# Patient Record
Sex: MALE | Race: WHITE | NOT HISPANIC OR LATINO | Employment: OTHER | URBAN - METROPOLITAN AREA
[De-identification: names, ages, dates, MRNs, and addresses within clinical notes are randomized per-mention and may not be internally consistent; named-entity substitution may affect disease eponyms.]

---

## 2017-01-17 ENCOUNTER — TELEPHONE (OUTPATIENT)
Dept: VASCULAR SURGERY | Facility: CLINIC | Age: 67
End: 2017-01-17

## 2017-01-17 NOTE — TELEPHONE ENCOUNTER
----- Message from Pedro More sent at 1/17/2017  9:49 AM CST -----  Elvia (Dr. Albion Calvillo) called to schedule a consult for the pt for first available/pls call back at [phone]

## 2017-02-16 ENCOUNTER — OFFICE VISIT (OUTPATIENT)
Dept: VASCULAR SURGERY | Facility: CLINIC | Age: 67
End: 2017-02-16
Payer: MEDICARE

## 2017-02-16 VITALS — HEIGHT: 73 IN | WEIGHT: 259 LBS | BODY MASS INDEX: 34.33 KG/M2 | RESPIRATION RATE: 18 BRPM

## 2017-02-16 DIAGNOSIS — I72.3 ILIAC ARTERY ANEURYSM, RIGHT: Primary | ICD-10-CM

## 2017-02-16 PROCEDURE — 99999 PR PBB SHADOW E&M-EST. PATIENT-LVL II: CPT | Mod: PBBFAC,,, | Performed by: THORACIC SURGERY (CARDIOTHORACIC VASCULAR SURGERY)

## 2017-02-16 PROCEDURE — 99212 OFFICE O/P EST SF 10 MIN: CPT | Mod: PBBFAC,PO | Performed by: THORACIC SURGERY (CARDIOTHORACIC VASCULAR SURGERY)

## 2017-02-16 PROCEDURE — 99205 OFFICE O/P NEW HI 60 MIN: CPT | Mod: S$PBB,,, | Performed by: THORACIC SURGERY (CARDIOTHORACIC VASCULAR SURGERY)

## 2017-02-16 RX ORDER — ASPIRIN 81 MG/1
81 TABLET ORAL DAILY
COMMUNITY

## 2017-02-16 RX ORDER — LISINOPRIL AND HYDROCHLOROTHIAZIDE 12.5; 2 MG/1; MG/1
1 TABLET ORAL DAILY
COMMUNITY

## 2017-02-16 RX ORDER — METOPROLOL SUCCINATE 25 MG/1
25 TABLET, EXTENDED RELEASE ORAL DAILY
COMMUNITY

## 2017-02-16 RX ORDER — AMLODIPINE BESYLATE 5 MG/1
5 TABLET ORAL DAILY
COMMUNITY

## 2017-02-16 RX ORDER — SIMVASTATIN 20 MG/1
20 TABLET, FILM COATED ORAL NIGHTLY
COMMUNITY

## 2017-02-16 NOTE — LETTER
February 16, 2017      Albino Calvillo MD  [address] [facility] [address] [facility]  [facility] [address]           [facility]  [address] [facility] [address]  Phone: [phone]          Patient: Kendell Rivera   MR Number: [id number]   YOB: 1950   Date of Visit: 2/16/2017       Dear Dr. Albino Calvillo:    Thank you for referring Kendell Rivera to me for evaluation. Attached you will find relevant portions of my assessment and plan of care.    If you have questions, please do not hesitate to call me. I look forward to following Kendell Rivera along with you.    Sincerely,    Hernesto Curry MD    Enclosure  CC:  No Recipients    If you would like to receive this communication electronically, please contact [email or web address] or [phone] to request more information on [facility] access.    For providers and/or their staff who would like to refer a patient to [facility], please contact us through our one-stop-shop provider referral line, [facility], at [phone].    If you feel you have received this communication in error or would no longer like to receive these types of communications, please e-mail [email or web address]

## 2017-02-16 NOTE — PROGRESS NOTES
OFFICE VISIT NOTE    I was asked to see this patient by Dr. Calvillo.  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  The patient had a CT angiogram of the chest, abdomen and pelvis.    His twin brother had undergone repair of ascending aortic aneurysm.  He has no   aneurysms in the chest.  The abdominal aorta has no aneurysms.  However, the   right common iliac artery has the saccular aneurysm.  He denies any abdominal,   back or pelvic pain.    PAST MEDICAL HISTORY:  Coronary artery disease, degenerative disease of the   right knee, hypertension.    PAST SURGICAL HISTORY:  Percutaneous coronary intervention.    ALLERGIES:  No known drug allergies.    MEDICATIONS:  Norvasc, Ecotrin, Prinzide, Toprol-XL, Xarelto, Zocor.    FAMILY HISTORY:  Positive for ascending aortic aneurysm.    SOCIAL HISTORY:  He quit smoking cigarettes in 1982.  Denies alcohol use.    PHYSICAL EXAMINATION:  VITAL SIGNS:  Blood pressure is 121/85, respiratory rate is 18, heart rate is   91, height 6 feet 1 inch and weight 259 pounds.  GENERAL:  He is awake and alert, in no apparent distress.  HEENT:  Head is normocephalic.  Pupils are equal, round, reactive.  Sclerae are   anicteric.  NECK:  Supple.  LUNGS:  Trachea midline.  HEART:  Has a regular rate and rhythm.  LUNGS:  Clear.  ABDOMEN:  Obese, soft and nontender.  I cannot palpate any masses.  EXTREMITIES:  No ulcers.  No edema.  No hyperpigmentation.  Pulse examination 2+   brachial, 2+ radial, 2+ femoral pulses bilaterally.  He has 2+ left dorsalis   pedis pulse and 1+ right dorsalis pedis pulse.  I cannot palpate posterior   tibial pulses in either lower extremity.  NEUROLOGIC:  Awake, alert and oriented.  No lateralizing neurologic deficits.    CT angiogram of the chest, abdomen and pelvis showed a 3.3 cm eccentric saccular   aneurysm in the distal right common iliac artery.    IMPRESSION:  1.  Aneurysm of the  right common iliac artery.  2.  Coronary artery disease.  3.  Hypertension.  4.  Family history of ascending aortic aneurysm.  5.  Past history of tobacco use.  6.  Status post percutaneous coronary intervention.    RECOMMENDATIONS:  The patient has a saccular 3.3 cm right common iliac artery   aneurysm.  I think that this needs to be fixed.  The patient denies any history   of a major infection.  I doubt that this is a mycotic aneurysm.  We will try to   fix this with an endograft.  The risks and benefits were discussed with the   patient and his family.  They voiced understanding and wished to proceed.      JUSTIN  dd: 02/16/2017 09:49:28 (CST)  td: 02/16/2017 16:07:24 (CST)  Doc ID   #[id number]  Job ID #[id number]    CC: Albino Calvillo M.D.

## 2017-03-08 ENCOUNTER — TELEPHONE (OUTPATIENT)
Dept: VASCULAR SURGERY | Facility: CLINIC | Age: 67
End: 2017-03-08

## 2017-03-08 NOTE — TELEPHONE ENCOUNTER
----- Message from Tyra Macedo sent at 3/8/2017  8:27 AM CST -----  Contact: self-  [phone]  Patient called asking to speak with the nurse, patient states the office was to call the patient.  Thanks!

## 2017-03-08 NOTE — TELEPHONE ENCOUNTER
Pt informed disc is with Rep for review and we will call back once a decision has been reached for which type of surgery he is eligible for. Verbalized understanding.

## 2017-03-15 ENCOUNTER — TELEPHONE (OUTPATIENT)
Dept: VASCULAR SURGERY | Facility: CLINIC | Age: 67
End: 2017-03-15

## 2017-03-24 ENCOUNTER — TELEPHONE (OUTPATIENT)
Dept: VASCULAR SURGERY | Facility: CLINIC | Age: 67
End: 2017-03-24

## 2017-03-24 NOTE — TELEPHONE ENCOUNTER
----- Message from Divine Varela sent at 3/24/2017 10:48 AM CDT -----  Contact: self  Patient called regarding questions to ask regarding his scheduled procedure. Please contact [phone] (hftw)

## 2017-03-24 NOTE — TELEPHONE ENCOUNTER
Pt asking if he will be able to drive to [place] 10 days after his surgery on 3/31/17.  Advised that it would not be recommended that he travel such a long distance so soon after having surgery.  Verbalized understanding.

## 2017-03-28 ENCOUNTER — TELEPHONE (OUTPATIENT)
Dept: VASCULAR SURGERY | Facility: CLINIC | Age: 67
End: 2017-03-28

## 2017-03-28 NOTE — TELEPHONE ENCOUNTER
----- Message from Rosa Isela Shirley sent at 3/28/2017  1:49 PM CDT -----  Patient needs to talk to office concerning his procedure on 3/31/17. Please call back at [phone].

## 2017-03-28 NOTE — TELEPHONE ENCOUNTER
Pt asking if he will need to fast before having surgery.  Advised that he will defiantly need to fast starting the night before surgery. Reviewed pre-op appointment and reminded pt of holding xarelto x 3 days .  Verbalized understanding.

## 2017-03-30 ENCOUNTER — DOCUMENTATION ONLY (OUTPATIENT)
Dept: VASCULAR SURGERY | Facility: CLINIC | Age: 67
End: 2017-03-30

## 2017-03-30 ENCOUNTER — TELEPHONE (OUTPATIENT)
Dept: VASCULAR SURGERY | Facility: CLINIC | Age: 67
End: 2017-03-30

## 2017-03-30 NOTE — TELEPHONE ENCOUNTER
[facility] pharmacy is checking their voice mail now,  Advised that the rx was called in at 1447 today. Verbalized understanding and stated will call back if there is a problem.

## 2017-03-30 NOTE — TELEPHONE ENCOUNTER
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  Pt asked that it be called into [facility] in [place].  Verbalized understanding.      Rx called in @ [phone] to [facility].

## 2017-03-30 NOTE — TELEPHONE ENCOUNTER
----- Message from Argelia Camacho sent at 3/30/2017  3:38 PM CDT -----  Contact: Patient  Kendell, patient [phone]  [facility] [place] cell [phone], Calling to let office know they are trying to get the Rx for staff just called into [facility] in [place], but they do not have the Rx. Please advise. Thanks.

## 2017-03-30 NOTE — TELEPHONE ENCOUNTER
----- Message from Esha Ty sent at 3/30/2017 12:48 PM CDT -----  Nisha with [facility] calling to speak with nurse concerning patient's labs/please call back at [phone] (unable to reach anyone by phone or IM)

## 2017-03-30 NOTE — PROGRESS NOTES
[facility] Vancomycin 1mg/kg pre-op instead of Ancef.  Per .

## 2017-04-04 ENCOUNTER — TELEPHONE (OUTPATIENT)
Dept: VASCULAR SURGERY | Facility: CLINIC | Age: 67
End: 2017-04-04

## 2017-04-04 NOTE — TELEPHONE ENCOUNTER
----- Message from Rosa Isela Shirley sent at 4/4/2017  1:23 PM CDT -----  Patient would like to make an appointment with office for a follow up. Please call to schedule at [phone].

## 2017-04-17 ENCOUNTER — TELEPHONE (OUTPATIENT)
Dept: VASCULAR SURGERY | Facility: CLINIC | Age: 67
End: 2017-04-17

## 2017-04-17 NOTE — TELEPHONE ENCOUNTER
----- Message from Gil Gimenez sent at 4/17/2017  2:08 PM CDT -----  Contact: pt  Pt is requesting a callback(have questions about procedure he had)   Call Back#[phone]  Thanks

## 2017-04-27 ENCOUNTER — OFFICE VISIT (OUTPATIENT)
Dept: VASCULAR SURGERY | Facility: CLINIC | Age: 67
End: 2017-04-27
Payer: MEDICARE

## 2017-04-27 VITALS
HEIGHT: 73 IN | DIASTOLIC BLOOD PRESSURE: 72 MMHG | BODY MASS INDEX: 34.85 KG/M2 | RESPIRATION RATE: 16 BRPM | SYSTOLIC BLOOD PRESSURE: 113 MMHG | WEIGHT: 263 LBS | HEART RATE: 98 BPM

## 2017-04-27 DIAGNOSIS — I72.3 ILIAC ARTERY ANEURYSM, RIGHT: Primary | ICD-10-CM

## 2017-04-27 PROCEDURE — 99999 PR PBB SHADOW E&M-EST. PATIENT-LVL III: CPT | Mod: PBBFAC,,, | Performed by: THORACIC SURGERY (CARDIOTHORACIC VASCULAR SURGERY)

## 2017-04-27 PROCEDURE — 99213 OFFICE O/P EST LOW 20 MIN: CPT | Mod: PBBFAC,PO | Performed by: THORACIC SURGERY (CARDIOTHORACIC VASCULAR SURGERY)

## 2017-04-27 PROCEDURE — 99024 POSTOP FOLLOW-UP VISIT: CPT | Mod: ,,, | Performed by: THORACIC SURGERY (CARDIOTHORACIC VASCULAR SURGERY)

## 2017-04-27 NOTE — PROGRESS NOTES
OFFICE VISIT NOTE    Mr. Rivera underwent endograft repair of right common iliac artery aneurysm with   preservation of the hypogastric artery.  He is doing very well.  His incisions   have healed.  He has no pain.  Feet are pink and warm with good capillary   refill.  We will do a CT angiogram of the abdomen and pelvis in six months.      JUSTIN  dd: 04/27/2017 10:38:40 (CDT)  td: 04/27/2017 11:35:37 (CDT)  Doc ID   #[id number]  Job ID #[id number]    CC:

## 2017-04-27 NOTE — MR AVS SNAPSHOT
[facility]  [address] [facility] [address]  Phone: [phone]                  Kendell Rivera   2017 10:00 AM   Office Visit    Description:  Male : 1950   Provider:  Hernesto Curry MD   Department:  [facility]           Reason for Visit     Post-op Evaluation                To Do List           Goals (5 Years of Data)     None      [facility]     [facility] Nurse Care Line -  Assistance  Unless otherwise directed by your provider, please contact [facility], our nurse care line that is available for  assistance.     Registered nurses in the [facility] provide: appointment scheduling, clinical advisement, health education, and other advisory services.  Call: [phone] (toll free)               Medications           Message regarding Medications     Verify the changes and/or additions to your medication regime listed below are the same as discussed with your clinician today.  If any of these changes or additions are incorrect, please notify your healthcare provider.             Verify that the below list of medications is an accurate representation of the medications you are currently taking.  If none reported, the list may be blank. If incorrect, please contact your healthcare provider. Carry this list with you in case of emergency.           Current Medications     amlodipine (NORVASC) 5 MG tablet Take 5 mg by mouth once daily.    aspirin (ECOTRIN) 81 MG EC tablet Take 81 mg by mouth once daily.    lisinopril-hydrochlorothiazide (PRINZIDE,ZESTORETIC) 20-12.5 mg per tablet Take 1 tablet by mouth once daily.    metoprolol succinate (TOPROL-XL) 25 MG 24 hr tablet Take 25 mg by mouth once daily.    multivitamin capsule Take 1 capsule by mouth once daily.    rivaroxaban (XARELTO) 20 mg Tab Take 20 mg by mouth daily with dinner or evening meal.    simvastatin (ZOCOR) 20 MG tablet Take 20 mg by mouth every  "evening.           Clinical Reference Information           Your Vitals Were     BP Pulse Resp Height Weight BMI    113/72 98 16 6' 1" (1.854 m) 119.3 kg (263 lb) 34.7 kg/m2      Blood Pressure          Most Recent Value    BP  113/72      Allergies as of 4/27/2017     No Known Allergies      Immunizations Administered on Date of Encounter - 4/27/2017     None      [facility] Sign-Up     Activating your [facility] account is as easy as 1-2-3!     1) Visit [email or web address], select Sign Up Now, enter this activation code and your date of birth, then select Next.  [email or web address]  Expires: 6/11/2017 10:35 AM      2) Create a username and password to use when you visit [facility] in the future and select a security question in case you lose your password and select Next.    3) Enter your e-mail address and click Sign Up!    Additional Information  If you have questions, please e-mail [email or web address] or call [phone] to talk to our [facility] staff. Remember, [facility] is NOT to be used for urgent needs. For medical emergencies, dial 911.         Language Assistance Services     ATTENTION: Language assistance services are available, free of charge. Please call [phone].      ATENCIÓN: Si habla español, tiene a guardado disposición servicios gratuitos de asistencia lingüística. Llame al [phone].     CHÚ Ý: N?u b?n nói Ti?ng Vi?t, có các d?ch v? h? tr? ngôn ng? mi?n phí dành cho b?n. G?i s? [phone].         [facility] complies with applicable Federal civil rights laws and does not discriminate on the basis of race, color, national origin, age, disability, or sex.        "

## 2017-04-28 DIAGNOSIS — I72.3 ANEURYSM OF ILIAC ARTERY: Primary | ICD-10-CM

## 2017-05-04 ENCOUNTER — TELEPHONE (OUTPATIENT)
Dept: VASCULAR SURGERY | Facility: CLINIC | Age: 67
End: 2017-05-04

## 2017-12-29 PROBLEM — I25.10 CAD (CORONARY ARTERY DISEASE): Status: ACTIVE | Noted: 2017-12-29
